# Patient Record
(demographics unavailable — no encounter records)

---

## 2025-05-08 NOTE — IMAGING
[de-identified] :  RIGHT Lower Extremity examined.   Inspection no gross deformity, skin intact, no erythema Palpation; patient with - tenderness to palpation in the groin, ++ tenderness over the greater troches ROM: 120 flexion, 30 IR, 60 ER, 60 Abduction, 30 Adduction ++ Stinchfield ++ FADIR, - LATIA - SLR 5/5 motor to lower extremity Sensation intact to light touch throughout all lower extremity dermatomes No numbness in lateral femoral cutaneous nerve 2+ distal pulses No Leg length discrepancy assessed at the medial mal   X-ray RT hip done at [facility] on 5/4/25 IMPRESSION: No acute radiographic findings, if there is clinical concern follow-up MRI can be ordered. No fractures noted. Joint lines well maintained.

## 2025-05-08 NOTE — HISTORY OF PRESENT ILLNESS
[de-identified] : 05/08/2025 HPI: KATIE GAMBLE is a 33-year y/o M who presents for right hip pain. Patient notes an episode of right hip pain after an episode where his right foot slipped off the pedal 1.5 months ago.  Patient notes a lot of pain while running recently. Patient has been running a lot over the past couple of months as he is training for a half iron man. Patient notes his pain has worsened with training. He notes stiffness when he wakes up and pain with WB activities such as walking and stairs. Patient went to [facility] ER for XR of right hip after his race on Sunday, 05/04 due to his right hip pain. His pain is in the groin. Patient has attempted conservative measures including ibuprofen with no relief.   Patient presents today, 33 year M, for evaluation of their RT hip Date of Injury/Onset: 1.5 month, worse since 5/4/25 Mechanism of injury: Patient was using exercise bike with resistance when foot slipped off the pedal. Patient is a runner and went to [facility] 5/4/25 after a run and experiencing pain afterwards This is not a Work-Related Injury being treated under Worker's Compensation. This is not an athletic injury occurring associated with an interscholastic or organized sports team.   Pain: At Rest: 5 /10 With Activity: 10 /10 Quality of symptoms: pain can be aching or sharp. currently experiencing constant pain. pain can be in the right groin, in the right hip laterally, and in right buttock. numbness in the right hip.   Affecting Sleep: Yes, sometimes Stiffness upon waking, lasting greater than 30mins: Yes Difficulty with stairs: Yes Difficulty getting in and out of car: Yes, sometimes Difficulty applying shoe: Yes Sit to stand stiffness: Yes Affects walking short/long distances? Yes Home/Work/Recreation affected? Yes   Improves with:  rest Worse with:  activity Have you been treated for this in the past? [facility] 5 4 25 Have you had surgery for this in the past? no   OTC Medicines: ibuprofen with no relief RX medicines:  was prescribed ibuprofen by [facility] but did not get it Heat, Ice, Elevation: None   CSI or Gel Injections: None Physical Therapy/ HEP: None   Previous Treatment/Imaging/Studies Since Last Visit: [facility] 5 4 25

## 2025-05-08 NOTE — ASSESSMENT
[FreeTextEntry1] : Assessment:   KATIE GAMBLE is a 33 year y/o M with history and physical exam consistent with potential right hip acetabular labrum tear, given his recent injury and worsening pain while running. Patient is having pain in the C-sign distribution and his is having pain into his groin with FADIR. No symptoms consistent with stress fracture today.    Plan:   - Lengthy discussion regarding options was had with the patient. Nonsurgical options including but not limited to cortisone, Prescription anti-inflammatory medications (both steroidal and non-steroidal), activity modification, non-impact exercise, maintaining a healthy BMI, and icing were reviewed.   - Discussed Home exercise Program as well as Rest, Ice and elevation.   - Obtaining an MRI of the right hip to evaluate for an acetabular labrum tear of the right hip.    - After discussion of options, Patient was provided with a prescription for Meloxicam 15mg Daily.   - Time was taken to discuss the importance of proper prescription drug management. They were instructed to take this daily with food for two weeks and then intermittently as needed. The patient was also warned of potential risks/side effects of the medication. These potential risks include bruising, gastrointestinal bleed, gastrointestinal burning, allergic reaction and reduced blood clotting. The patient expressed verbal understanding. I recommend that the patient follow-up with their medical physician to discuss any significant specific potential issues with long term use such as interactions with current medications or with exacerbation of underlying medical morbidities.   - Follow up after MRI to review results and discuss further management.    - The Patient was asked if they had any remaining questions about today's visit and the diagnosis, and all questions were answered. Patient understands the plan going forward.

## 2025-05-15 NOTE — HISTORY OF PRESENT ILLNESS
[de-identified] : 05/15/2025: KATIE GAMBLE is a 33 year y/o M who presents for a f/u evaluation of right hip pain. Patient was prescribed Meloxicam at his last visit. Patient here today to review results of MRI right hip done at  on 5/13/25. Patient continues to have right groin pain but states it is improving.   05/08/2025 HPI: KATIE GAMBLE is a 33-year y/o M who presents for right hip pain. Patient notes an episode of right hip pain after an episode where his right foot slipped off the pedal 1.5 months ago.  Patient notes a lot of pain while running recently. Patient has been running a lot over the past couple of months as he is training for a half iron man. Patient notes his pain has worsened with training. He notes stiffness when he wakes up and pain with WB activities such as walking and stairs. Patient went to [facility] ER for XR of right hip after his race on Sunday, 05/04 due to his right hip pain. His pain is in the groin. Patient has attempted conservative measures including ibuprofen with no relief.   Patient presents today, 33 year M, for evaluation of their RT hip Date of Injury/Onset: 1.5 month, worse since 5/4/25 Mechanism of injury: Patient was using exercise bike with resistance when foot slipped off the pedal. Patient is a runner and went to [facility] 5/4/25 after a run and experiencing pain afterwards This is not a Work-Related Injury being treated under Worker's Compensation. This is not an athletic injury occurring associated with an interscholastic or organized sports team.   Pain: At Rest: 5 /10 With Activity: 10 /10 Quality of symptoms: pain can be aching or sharp. currently experiencing constant pain. pain can be in the right groin, in the right hip laterally, and in right buttock. numbness in the right hip.   Affecting Sleep: Yes, sometimes Stiffness upon waking, lasting greater than 30mins: Yes Difficulty with stairs: Yes Difficulty getting in and out of car: Yes, sometimes Difficulty applying shoe: Yes Sit to stand stiffness: Yes Affects walking short/long distances? Yes Home/Work/Recreation affected? Yes   Improves with:  rest Worse with:  activity Have you been treated for this in the past? [facility] 5 4 25 Have you had surgery for this in the past? no   OTC Medicines: ibuprofen with no relief RX medicines:  was prescribed ibuprofen by [facility] but did not get it Heat, Ice, Elevation: None   CSI or Gel Injections: None Physical Therapy/ HEP: None   Previous Treatment/Imaging/Studies Since Last Visit: [facility] 5 4 25

## 2025-05-15 NOTE — ASSESSMENT
[FreeTextEntry1] : Assessment:   KATIE GAMBLE is a 33-year y/o M with history and physical exam consistent with stress fracture of the right femoral neck, visualized on MRI. Stress fracture is on the compression side and there is a good prognosis for healing.    Plan:   - Lengthy discussion regarding options was had with the patient. Nonsurgical options including but not limited to cortisone, Prescription anti-inflammatory medications (both steroidal and non-steroidal), activity modification, non-impact exercise, maintaining a healthy BMI, and icing were reviewed.   - Discussed Home exercise Program as well as Rest, Ice and elevation.   -  After discussion of options, Patient was provided with a prescription for Ibuprofen 600mg TID. Time was taken to discuss the importance of proper prescription drug management. They were instructed to take this daily with food for two weeks and then intermittently as needed. The patient was also warned of potential risks/side effects of the medication. These potential risks include bruising, gastrointestinal bleed, gastrointestinal burning, allergic reaction and reduced blood clotting. The patient expressed verbal understanding. I recommend that the patient follow-up with their medical physician to discuss any significant specific potential issues with long term use such as interactions with current medications or with exacerbation of underlying medical morbidities.   - NWB RLE with crutches. Rx provided for [employer].   - Recommend Ca/Vt D.   - Recommendations provided for [facility]. Letter provided.   - F/u in 6 weeks to monitor progress. Will obtain new XR of right hip. Can transition WB status at that time based off symptoms.    - The Patient was asked if they had any remaining questions about today's visit and the diagnosis, and all questions were answered. Patient understands the plan going forward.

## 2025-05-15 NOTE — IMAGING
[de-identified] : RIGHT Lower Extremity examined.   Inspection no gross deformity, skin intact, no erythema Palpation; patient with - tenderness to palpation in the groin, ++ tenderness over the greater troches ROM: 120 flexion, 30 IR, 60 ER, 60 Abduction, 30 Adduction ++ Stinchfield ++ FADIR, - LATIA - SLR 5/5 motor to lower extremity Sensation intact to light touch throughout all lower extremity dermatomes No numbness in lateral femoral cutaneous nerve 2+ distal pulses No Leg length discrepancy assessed at the medial mal  MRI right hip done at  on 5/13/25. IMPRESSION: Mildly motion limited study. Acute medial femoral neck stress fracture.  X-ray RT hip done at [facility] on 5/4/25 IMPRESSION: No acute radiographic findings, if there is clinical concern follow-up MRI can be ordered. No fractures noted. Joint lines well maintained.

## 2025-06-26 NOTE — IMAGING
[de-identified] : RIGHT Lower Extremity examined.   Inspection no gross deformity, skin intact, no erythema Palpation; patient with - tenderness to palpation in the groin, - tenderness over the greater troches ROM: 120 flexion, 30 IR, 60 ER, 60 Abduction, 30 Adduction - Stinchfield - FADIR, - LATIA - SLR 5/5 motor to lower extremity Sensation intact to light touch throughout all lower extremity dermatomes No numbness in lateral femoral cutaneous nerve 2+ distal pulses No Leg length discrepancy assessed at the medial mal  06/26: AP Pelvis, and Frog lateral of RIGHT hip. Joint line well maintained. No acute fracture visualized on XR imaging. No displacement of nondisplaced femoral neck stress fracture, which was visualized on previous MRI on 05/13.  MRI right hip done at  on 5/13/25. IMPRESSION: Mildly motion limited study. Acute medial femoral neck stress fracture.  X-ray RT hip done at [facility] on 5/4/25 IMPRESSION: No acute radiographic findings, if there is clinical concern follow-up MRI can be ordered. No fractures noted. Joint lines well maintained.

## 2025-06-26 NOTE — ASSESSMENT
[FreeTextEntry1] : Assessment:   KATIE GAMBLE is a 33-year y/o M with history and physical exam consistent with stress fracture of the right femoral neck, visualized on MRI. No displacement on XR imaging today. Stress fracture is on the compression side and involves less than 50% of the femoral neck - there is a good prognosis for healing.   Plan:   - Lengthy discussion regarding options was had with the patient. Nonsurgical options including but not limited to cortisone, Prescription anti-inflammatory medications (both steroidal and non-steroidal), activity modification, non-impact exercise, maintaining a healthy BMI, and icing were reviewed.   - Discussed Home exercise Program as well as Rest, Ice and elevation.   -  After discussion of options, Patient was provided with another rx for Ibuprofen 600mg TID as needed for pain. Time was taken to discuss the importance of proper prescription drug management. They were instructed to take this daily with food for two weeks and then intermittently as needed. The patient was also warned of potential risks/side effects of the medication. These potential risks include bruising, gastrointestinal bleed, gastrointestinal burning, allergic reaction and reduced blood clotting. The patient expressed verbal understanding. I recommend that the patient follow-up with their medical physician to discuss any significant specific potential issues with long term use such as interactions with current medications or with exacerbation of underlying medical morbidities.   - Continued STRICT NWB RLE with crutches for the next week. Then protected weightbearing as tolerated with crutches thereafter. Patient can increase 25% of weight each week until his f/u appointment.   - Recommend continued Ca/Vt D.   - Recommendations provided for [facility]. Letter provided.   - F/u in 5 weeks to monitor progress. Will obtain new XR of right hip. Can transition WB status at that time based off symptoms.    - The Patient was asked if they had any remaining questions about today's visit and the diagnosis, and all questions were answered. Patient understands the plan going forward.

## 2025-06-26 NOTE — HISTORY OF PRESENT ILLNESS
[de-identified] : 06/26/2025: KATIE GAMBLE is a 33-year y/o M who presents for a f/u evaluation of right hip pain. Patient was prescribed Ibuprofen 600mg at his last visit. Patient states he used crutches following previous visit and discontinued use about five days ago. Patient had relief with Meloxicam and Ibuprofen, states currently takes Ibuprofen PRN. Patient feeling better today than at last visit but continues to have pain into the groin.   05/15/2025: KATIE GAMBLE is a 33 year y/o M who presents for a f/u evaluation of right hip pain. Patient was prescribed Meloxicam at his last visit. Patient here today to review results of MRI right hip done at  on 5/13/25. Patient continues to have right groin pain but states it is improving.   05/08/2025 HPI: KATIE GAMBLE is a 33-year y/o M who presents for right hip pain. Patient notes an episode of right hip pain after an episode where his right foot slipped off the pedal 1.5 months ago.  Patient notes a lot of pain while running recently. Patient has been running a lot over the past couple of months as he is training for a half iron man. Patient notes his pain has worsened with training. He notes stiffness when he wakes up and pain with WB activities such as walking and stairs. Patient went to [facility] ER for XR of right hip after his race on Sunday, 05/04 due to his right hip pain. His pain is in the groin. Patient has attempted conservative measures including ibuprofen with no relief.   Patient presents today, 33 year M, for evaluation of their RT hip Date of Injury/Onset: 1.5 month, worse since 5/4/25 Mechanism of injury: Patient was using exercise bike with resistance when foot slipped off the pedal. Patient is a runner and went to [facility] 5/4/25 after a run and experiencing pain afterwards This is not a Work-Related Injury being treated under Worker's Compensation. This is not an athletic injury occurring associated with an interscholastic or organized sports team.   Pain: At Rest: 5 /10 With Activity: 10 /10 Quality of symptoms: pain can be aching or sharp. currently experiencing constant pain. pain can be in the right groin, in the right hip laterally, and in right buttock. numbness in the right hip.   Affecting Sleep: Yes, sometimes Stiffness upon waking, lasting greater than 30mins: Yes Difficulty with stairs: Yes Difficulty getting in and out of car: Yes, sometimes Difficulty applying shoe: Yes Sit to stand stiffness: Yes Affects walking short/long distances? Yes Home/Work/Recreation affected? Yes   Improves with:  rest Worse with:  activity Have you been treated for this in the past? [facility] 5 4 25 Have you had surgery for this in the past? no   OTC Medicines: ibuprofen with no relief RX medicines:  was prescribed ibuprofen by [facility] but did not get it Heat, Ice, Elevation: None   CSI or Gel Injections: None Physical Therapy/ HEP: None   Previous Treatment/Imaging/Studies Since Last Visit: [facility] 5 4 25